# Patient Record
(demographics unavailable — no encounter records)

---

## 2025-05-01 NOTE — ASSESSMENT
[FreeTextEntry1] : Data reviewed:  PSG SDI 6/9/2023: mild NUBIA, AHI 13, bruna 90%  Impression: Mild NUBIA Tiredness  Plan: Few options here. Again, I think part of her tiredness may be just inadequate sleep time and would respond to sleeping more. For the apnea, she could try an oral appliance again. We could try CPAP - would need to repeat the sleep study. We could just repeat the sleep study and see if the severity has changed. We looked at the old test - only 9% of her sleep time was supine, so a positional device isn't probably going to be of much use. She will consider what she'd like to do and let me know.

## 2025-05-01 NOTE — CONSULT LETTER
[Dear  ___] : Dear  [unfilled], [Please see my note below.] : Please see my note below. [Consult Closing:] : Thank you very much for allowing me to participate in the care of this patient.  If you have any questions, please do not hesitate to contact me. [Sincerely,] : Sincerely, [Courtesy Letter:] : I had the pleasure of seeing your patient, [unfilled], in my office today. [FreeTextEntry2] : Gladis Childs MD nd [facility] [address] [place] [id number]  [FreeTextEntry3] : Thania Mondragon MD, [facility]

## 2025-05-01 NOTE — HISTORY OF PRESENT ILLNESS
[TextBox_4] : 09/28/2023: Asked to evaluate patient by Dr Childs for sleep apnea.  has always complained of snoring but starting only in Jan she started to notice becoming sleepy at 2pm, and by 11pm was very sleepy though her preferred bedtime is 2am. Generally gets 5-6 hours of sleep but until Jan was ok. Had PSG showing mild NUBIA. In the interim she started taking magnesium, and started doing tongue exercises she found online, and now she is asymptomatic and no longer having excessive sleepiness.  says she is snoring less.  with no daytime dyspnea or hx lung disease.  5/1/2025: Comes back with tiredness. Feels like her tired correlates with her sleep quality as measured by her [place]a ring. Does not feel sleepy at work, but will tend to nod off on public transit. No problem waking up in the morning. Sleeping 4-5 hours. Mostly working a lot. Continues w magnesium and throat exercises.  is complaining less about snoring. Has gained just a few lbs. 3-4 lbs. She tried an online oral appliance, not wanting to pay $3k for one from her dentist. This wasn't too effective. [ESS] : 5